# Patient Record
Sex: FEMALE
[De-identification: names, ages, dates, MRNs, and addresses within clinical notes are randomized per-mention and may not be internally consistent; named-entity substitution may affect disease eponyms.]

---

## 2019-08-12 ENCOUNTER — HOSPITAL ENCOUNTER (OUTPATIENT)
Dept: HOSPITAL 95 - ATC | Age: 74
Discharge: HOME | End: 2019-08-12
Attending: INTERNAL MEDICINE
Payer: MEDICARE

## 2019-08-12 DIAGNOSIS — C92.00: ICD-10-CM

## 2019-08-12 DIAGNOSIS — C93.10: ICD-10-CM

## 2019-08-12 DIAGNOSIS — C91.10: Primary | ICD-10-CM

## 2019-08-12 DIAGNOSIS — D69.3: ICD-10-CM

## 2019-08-12 DIAGNOSIS — Z87.891: ICD-10-CM

## 2019-08-12 LAB
BASOPHILS # BLD: 0 K/MM3 (ref 0–0.23)
BASOPHILS NFR BLD: 0 % (ref 0–2)
BLASTS NFR BLD MANUAL: 64 % (ref 0–0)
DEPRECATED RDW RBC AUTO: 73.9 FL (ref 35.1–46.3)
EOSINOPHIL # BLD: 0 K/MM3 (ref 0–0.68)
EOSINOPHIL NFR BLD: 0 % (ref 0–6)
ERYTHROCYTE [DISTWIDTH] IN BLOOD BY AUTOMATED COUNT: 21.9 % (ref 11.7–14.2)
HCT VFR BLD AUTO: 23.8 % (ref 33–51)
HGB BLD-MCNC: 7.6 G/DL (ref 11.5–16)
LYMPHOCYTES # BLD: 31.57 K/MM3 (ref 0.84–5.2)
LYMPHOCYTES NFR BLD: 36 % (ref 21–46)
MCHC RBC AUTO-ENTMCNC: 31.9 G/DL (ref 31.5–36.5)
MCV RBC AUTO: 97 FL (ref 80–100)
MONOCYTES # BLD: 0 K/MM3 (ref 0.16–1.47)
MONOCYTES NFR BLD: 0 % (ref 4–13)
NEUTS SEG # BLD MANUAL: 0 K/MM3 (ref 1.96–9.15)
NEUTS SEG NFR BLD MANUAL: 0 % (ref 41–73)
NRBC # BLD AUTO: 0.29 K/MM3 (ref 0–0.02)
NRBC BLD AUTO-RTO: 0.3 /100 WBC (ref 0–0.2)
PLATELET # BLD AUTO: 3 K/MM3 (ref 150–400)
TOTAL CELLS COUNTED BLD: 100

## 2019-08-12 PROCEDURE — P9035 PLATELET PHERES LEUKOREDUCED: HCPCS

## 2019-08-16 ENCOUNTER — HOSPITAL ENCOUNTER (OUTPATIENT)
Dept: HOSPITAL 95 - ATC | Age: 74
Discharge: HOME | End: 2019-08-16
Attending: INTERNAL MEDICINE
Payer: MEDICARE

## 2019-08-16 DIAGNOSIS — C93.10: ICD-10-CM

## 2019-08-16 DIAGNOSIS — C91.10: ICD-10-CM

## 2019-08-16 DIAGNOSIS — D69.3: ICD-10-CM

## 2019-08-16 DIAGNOSIS — C92.00: Primary | ICD-10-CM

## 2019-08-16 PROCEDURE — P9016 RBC LEUKOCYTES REDUCED: HCPCS

## 2019-08-16 PROCEDURE — C1751 CATH, INF, PER/CENT/MIDLINE: HCPCS

## 2019-08-19 LAB
DEPRECATED RDW RBC AUTO: 62.1 FL (ref 35.1–46.3)
ERYTHROCYTE [DISTWIDTH] IN BLOOD BY AUTOMATED COUNT: 18.9 % (ref 11.7–14.2)
HCT VFR BLD AUTO: 27.6 % (ref 33–51)
HGB BLD-MCNC: 9 G/DL (ref 11.5–16)
MCHC RBC AUTO-ENTMCNC: 32.6 G/DL (ref 31.5–36.5)
MCV RBC AUTO: 95 FL (ref 80–100)
NRBC # BLD AUTO: 0.37 K/MM3 (ref 0–0.02)
NRBC BLD AUTO-RTO: 0.3 /100 WBC (ref 0–0.2)
PLATELET # BLD AUTO: 11 K/MM3 (ref 150–400)

## 2019-08-20 ENCOUNTER — HOSPITAL ENCOUNTER (OUTPATIENT)
Dept: HOSPITAL 95 - ATC | Age: 74
Discharge: HOME | End: 2019-08-20
Attending: INTERNAL MEDICINE
Payer: MEDICARE

## 2019-08-20 DIAGNOSIS — D69.3: ICD-10-CM

## 2019-08-20 DIAGNOSIS — C93.10: Primary | ICD-10-CM

## 2019-08-20 DIAGNOSIS — Z79.899: ICD-10-CM

## 2019-08-20 DIAGNOSIS — C91.10: ICD-10-CM

## 2019-08-20 DIAGNOSIS — C92.00: ICD-10-CM

## 2019-08-20 DIAGNOSIS — Z87.891: ICD-10-CM

## 2019-08-20 PROCEDURE — P9053 PLT, PHER, L/R CMV-NEG, IRR: HCPCS

## 2019-08-22 ENCOUNTER — HOSPITAL ENCOUNTER (OUTPATIENT)
Dept: HOSPITAL 95 - ATC | Age: 74
Discharge: HOME | End: 2019-08-22
Attending: INTERNAL MEDICINE
Payer: MEDICARE

## 2019-08-22 DIAGNOSIS — C93.10: ICD-10-CM

## 2019-08-22 DIAGNOSIS — D69.3: ICD-10-CM

## 2019-08-22 DIAGNOSIS — Z87.891: ICD-10-CM

## 2019-08-22 DIAGNOSIS — C92.00: Primary | ICD-10-CM

## 2019-08-22 DIAGNOSIS — C91.10: ICD-10-CM

## 2019-08-22 LAB
ALBUMIN SERPL BCP-MCNC: 3.5 G/DL (ref 3.4–5)
ALBUMIN/GLOB SERPL: 0.9 {RATIO} (ref 0.8–1.8)
ALT SERPL W P-5'-P-CCNC: 54 U/L (ref 12–78)
ANION GAP SERPL CALCULATED.4IONS-SCNC: 9 MMOL/L (ref 6–16)
AST SERPL W P-5'-P-CCNC: 38 U/L (ref 12–37)
BASOPHILS # BLD: 0 K/MM3 (ref 0–0.23)
BASOPHILS NFR BLD: 0 % (ref 0–2)
BILIRUB SERPL-MCNC: 0.4 MG/DL (ref 0.1–1)
BLASTS NFR BLD MANUAL: 64 % (ref 0–0)
BUN SERPL-MCNC: 20 MG/DL (ref 8–24)
CALCIUM SERPL-MCNC: 9.1 MG/DL (ref 8.5–10.1)
CHLORIDE SERPL-SCNC: 106 MMOL/L (ref 98–108)
CO2 SERPL-SCNC: 26 MMOL/L (ref 21–32)
CREAT SERPL-MCNC: 0.52 MG/DL (ref 0.4–1)
DEPRECATED RDW RBC AUTO: 61.4 FL (ref 35.1–46.3)
EOSINOPHIL # BLD: 0 K/MM3 (ref 0–0.68)
EOSINOPHIL NFR BLD: 0 % (ref 0–6)
ERYTHROCYTE [DISTWIDTH] IN BLOOD BY AUTOMATED COUNT: 18.3 % (ref 11.7–14.2)
GLOBULIN SER CALC-MCNC: 3.7 G/DL (ref 2.2–4)
GLUCOSE SERPL-MCNC: 146 MG/DL (ref 70–99)
HCT VFR BLD AUTO: 25.8 % (ref 33–51)
HGB BLD-MCNC: 8.4 G/DL (ref 11.5–16)
LYMPHOCYTES # BLD: 28.75 K/MM3 (ref 0.84–5.2)
LYMPHOCYTES NFR BLD: 30 % (ref 21–46)
MCHC RBC AUTO-ENTMCNC: 32.6 G/DL (ref 31.5–36.5)
MCV RBC AUTO: 96 FL (ref 80–100)
MONOCYTES # BLD: 2.87 K/MM3 (ref 0.16–1.47)
MONOCYTES NFR BLD: 3 % (ref 4–13)
NEUTS SEG # BLD MANUAL: 0 K/MM3 (ref 1.96–9.15)
NEUTS SEG NFR BLD MANUAL: 0 % (ref 41–73)
NRBC # BLD AUTO: 0.32 K/MM3 (ref 0–0.02)
NRBC BLD AUTO-RTO: 0.3 /100 WBC (ref 0–0.2)
PHOSPHATE SERPL-MCNC: 2.8 MG/DL (ref 2.5–4.9)
PLATELET # BLD AUTO: 23 K/MM3 (ref 150–400)
POTASSIUM SERPL-SCNC: 3.4 MMOL/L (ref 3.5–5.5)
PROT SERPL-MCNC: 7.2 G/DL (ref 6.4–8.2)
SODIUM SERPL-SCNC: 141 MMOL/L (ref 136–145)
TOTAL CELLS COUNTED BLD: 100
URATE SERPL-MCNC: 5.4 MG/DL (ref 2.6–6)
WBC OTHER NFR BLD MANUAL: 3 % (ref 0–0)

## 2019-08-24 ENCOUNTER — HOSPITAL ENCOUNTER (OUTPATIENT)
Dept: HOSPITAL 95 - ATC | Age: 74
Discharge: HOME | End: 2019-08-24
Attending: INTERNAL MEDICINE
Payer: MEDICARE

## 2019-08-24 DIAGNOSIS — C91.10: ICD-10-CM

## 2019-08-24 DIAGNOSIS — C93.10: ICD-10-CM

## 2019-08-24 DIAGNOSIS — D69.3: ICD-10-CM

## 2019-08-24 DIAGNOSIS — Z87.891: ICD-10-CM

## 2019-08-24 DIAGNOSIS — C92.00: Primary | ICD-10-CM

## 2019-08-24 LAB
ALBUMIN SERPL BCP-MCNC: 3.3 G/DL (ref 3.4–5)
ALBUMIN/GLOB SERPL: 0.9 {RATIO} (ref 0.8–1.8)
ALT SERPL W P-5'-P-CCNC: 45 U/L (ref 12–78)
ANION GAP SERPL CALCULATED.4IONS-SCNC: 5 MMOL/L (ref 6–16)
AST SERPL W P-5'-P-CCNC: 37 U/L (ref 12–37)
BASOPHILS # BLD: 0 K/MM3 (ref 0–0.23)
BASOPHILS NFR BLD: 0 % (ref 0–2)
BILIRUB SERPL-MCNC: 0.4 MG/DL (ref 0.1–1)
BLASTS NFR BLD MANUAL: 77 % (ref 0–0)
BUN SERPL-MCNC: 22 MG/DL (ref 8–24)
CALCIUM SERPL-MCNC: 9.2 MG/DL (ref 8.5–10.1)
CHLORIDE SERPL-SCNC: 103 MMOL/L (ref 98–108)
CO2 SERPL-SCNC: 27 MMOL/L (ref 21–32)
CREAT SERPL-MCNC: 0.46 MG/DL (ref 0.4–1)
DEPRECATED RDW RBC AUTO: 58.1 FL (ref 35.1–46.3)
EOSINOPHIL # BLD: 0 K/MM3 (ref 0–0.68)
EOSINOPHIL NFR BLD: 0 % (ref 0–6)
ERYTHROCYTE [DISTWIDTH] IN BLOOD BY AUTOMATED COUNT: 17.8 % (ref 11.7–14.2)
GLOBULIN SER CALC-MCNC: 3.7 G/DL (ref 2.2–4)
GLUCOSE SERPL-MCNC: 118 MG/DL (ref 70–99)
HCT VFR BLD AUTO: 24.9 % (ref 33–51)
HGB BLD-MCNC: 8.1 G/DL (ref 11.5–16)
LYMPHOCYTES # BLD: 18.45 K/MM3 (ref 0.84–5.2)
LYMPHOCYTES NFR BLD: 21 % (ref 21–46)
MCHC RBC AUTO-ENTMCNC: 32.5 G/DL (ref 31.5–36.5)
MCV RBC AUTO: 94 FL (ref 80–100)
MONOCYTES # BLD: 0.87 K/MM3 (ref 0.16–1.47)
MONOCYTES NFR BLD: 1 % (ref 4–13)
NEUTS SEG # BLD MANUAL: 0.87 K/MM3 (ref 1.96–9.15)
NEUTS SEG NFR BLD MANUAL: 1 % (ref 41–73)
NRBC # BLD AUTO: 0.23 K/MM3 (ref 0–0.02)
NRBC BLD AUTO-RTO: 0.3 /100 WBC (ref 0–0.2)
PHOSPHATE SERPL-MCNC: 3.3 MG/DL (ref 2.5–4.9)
PLATELET # BLD AUTO: 12 K/MM3 (ref 150–400)
POTASSIUM SERPL-SCNC: 3.9 MMOL/L (ref 3.5–5.5)
PROT SERPL-MCNC: 7 G/DL (ref 6.4–8.2)
SODIUM SERPL-SCNC: 135 MMOL/L (ref 136–145)
TOTAL CELLS COUNTED BLD: 100
URATE SERPL-MCNC: 6.8 MG/DL (ref 2.6–6)

## 2019-08-25 NOTE — NUR
SPOKE WITH DR. MARY CASTELAN ANSWERING SERVICE CONCERNING THIS PTS LABS
THAT WERE DONE YESTERDAY.  DR CASTELAN WAS TRANSFERED TO LAB AND TOLD
CONCERNING THE CRITICAL VALUE.  THE PERSONS # WAS  LAB.PAS.

## 2019-08-26 ENCOUNTER — HOSPITAL ENCOUNTER (OUTPATIENT)
Dept: HOSPITAL 95 - ATC | Age: 74
Discharge: HOME | End: 2019-08-26
Attending: INTERNAL MEDICINE
Payer: MEDICARE

## 2019-08-26 DIAGNOSIS — C92.00: Primary | ICD-10-CM

## 2019-08-26 LAB
ALBUMIN SERPL BCP-MCNC: 3.4 G/DL
ALBUMIN/GLOB SERPL: 0.9 {RATIO}
ALT SERPL W P-5'-P-CCNC: 42 U/L
ANION GAP SERPL CALCULATED.4IONS-SCNC: 7 MMOL/L
AST SERPL W P-5'-P-CCNC: 33 U/L
BASOPHILS # BLD: 0 K/MM3
BASOPHILS NFR BLD: 0 %
BILIRUB SERPL-MCNC: 0.5 MG/DL
BLASTS NFR BLD MANUAL: 72 %
BUN SERPL-MCNC: 24 MG/DL
CALCIUM SERPL-MCNC: 9.3 MG/DL
CHLORIDE SERPL-SCNC: 103 MMOL/L
CO2 SERPL-SCNC: 26 MMOL/L
CREAT SERPL-MCNC: 0.51 MG/DL
DEPRECATED RDW RBC AUTO: 57.5 FL
EOSINOPHIL # BLD: 0 K/MM3
EOSINOPHIL NFR BLD: 0 %
ERYTHROCYTE [DISTWIDTH] IN BLOOD BY AUTOMATED COUNT: 18 %
GLOBULIN SER CALC-MCNC: 3.8 G/DL
GLUCOSE SERPL-MCNC: 93 MG/DL
HCT VFR BLD AUTO: 25.5 %
HGB BLD-MCNC: 8.4 G/DL
LYMPHOCYTES # BLD: 12.75 K/MM3
LYMPHOCYTES NFR BLD: 25 %
MCHC RBC AUTO-ENTMCNC: 32.9 G/DL
MCV RBC AUTO: 93 FL
MONOCYTES # BLD: 1.53 K/MM3
MONOCYTES NFR BLD: 3 %
NRBC # BLD AUTO: 0.21 K/MM3
NRBC BLD AUTO-RTO: 0.4 /100 WBC
PHOSPHATE SERPL-MCNC: 3.1 MG/DL
PLATELET # BLD AUTO: 6 K/MM3
POTASSIUM SERPL-SCNC: 3.7 MMOL/L
PROT SERPL-MCNC: 7.2 G/DL
SODIUM SERPL-SCNC: 136 MMOL/L
TOTAL CELLS COUNTED BLD: 100
URATE SERPL-MCNC: 6.8 MG/DL

## 2019-08-27 ENCOUNTER — HOSPITAL ENCOUNTER (OUTPATIENT)
Dept: HOSPITAL 95 - TRN | Age: 74
Discharge: HOME | End: 2019-08-27
Attending: INTERNAL MEDICINE
Payer: MEDICARE

## 2019-08-27 DIAGNOSIS — C93.10: ICD-10-CM

## 2019-08-27 DIAGNOSIS — Z79.899: ICD-10-CM

## 2019-08-27 DIAGNOSIS — D69.3: ICD-10-CM

## 2019-08-27 DIAGNOSIS — Z87.891: ICD-10-CM

## 2019-08-27 DIAGNOSIS — C91.10: ICD-10-CM

## 2019-08-27 DIAGNOSIS — C92.00: Primary | ICD-10-CM

## 2019-08-27 PROCEDURE — P9053 PLT, PHER, L/R CMV-NEG, IRR: HCPCS

## 2019-08-27 NOTE — NUR
SHE ARRIVED TO ROOM 344 AS AN OUTPT FOR A PLATELET INFUSION. ORDERS ENTERED.
BLOOD DRAWN EASILY FROM HER PICC LINE. SHE HAS BEEN TRYING TO SLEEP. SHE IS
A&O, WEAK, BUT ABLE TO TAKE A FEW STEPS OVER TO THE BED. I GAVE HER ICE WATER
TO DRINK AND SHE HAS A WARM BLANKET. SHE SAYS SHE ATE A LATE BREAKFAST AND IS
NOT HUNGRY AT THIS TIME.

## 2019-08-27 NOTE — NUR
NO PROBLEMS WITH PLATELET INFUSION. SHE HAS BEEN ESCORTED OUT TO HER CAR. NO
COMPLAINTS. CAP CHANGED ON HER PICC LINE POST TRANSFUSION.

## 2019-08-31 ENCOUNTER — HOSPITAL ENCOUNTER (INPATIENT)
Dept: HOSPITAL 95 - ER | Age: 74
LOS: 1 days | Discharge: HOSPICE | DRG: 871 | End: 2019-09-01
Attending: HOSPITALIST | Admitting: HOSPITALIST
Payer: MEDICARE

## 2019-08-31 VITALS — WEIGHT: 111.11 LBS | BODY MASS INDEX: 17.44 KG/M2 | HEIGHT: 67.01 IN

## 2019-08-31 DIAGNOSIS — A41.9: Primary | ICD-10-CM

## 2019-08-31 DIAGNOSIS — D70.9: ICD-10-CM

## 2019-08-31 DIAGNOSIS — I10: ICD-10-CM

## 2019-08-31 DIAGNOSIS — R65.21: ICD-10-CM

## 2019-08-31 DIAGNOSIS — Z51.5: ICD-10-CM

## 2019-08-31 DIAGNOSIS — D69.3: ICD-10-CM

## 2019-08-31 DIAGNOSIS — D63.0: ICD-10-CM

## 2019-08-31 DIAGNOSIS — C91.00: ICD-10-CM

## 2019-08-31 DIAGNOSIS — C93.10: ICD-10-CM

## 2019-08-31 DIAGNOSIS — Z90.81: ICD-10-CM

## 2019-08-31 DIAGNOSIS — E87.1: ICD-10-CM

## 2019-08-31 DIAGNOSIS — B37.0: ICD-10-CM

## 2019-08-31 LAB
ALBUMIN SERPL BCP-MCNC: 3.2 G/DL (ref 3.4–5)
ALBUMIN/GLOB SERPL: 0.8 {RATIO} (ref 0.8–1.8)
ALT SERPL W P-5'-P-CCNC: 80 U/L (ref 12–78)
ANION GAP SERPL CALCULATED.4IONS-SCNC: 7 MMOL/L (ref 6–16)
AST SERPL W P-5'-P-CCNC: 99 U/L (ref 12–37)
BASOPHILS # BLD: 0 K/MM3 (ref 0–0.23)
BASOPHILS NFR BLD: 0 % (ref 0–2)
BILIRUB SERPL-MCNC: 0.8 MG/DL (ref 0.1–1)
BLASTS NFR BLD MANUAL: 46 % (ref 0–0)
BUN SERPL-MCNC: 40 MG/DL (ref 8–24)
CALCIUM SERPL-MCNC: 9 MG/DL (ref 8.5–10.1)
CHLORIDE SERPL-SCNC: 102 MMOL/L (ref 98–108)
CO2 SERPL-SCNC: 25 MMOL/L (ref 21–32)
CREAT SERPL-MCNC: 0.61 MG/DL (ref 0.4–1)
DEPRECATED RDW RBC AUTO: 53.1 FL (ref 35.1–46.3)
EOSINOPHIL # BLD: 0 K/MM3 (ref 0–0.68)
EOSINOPHIL NFR BLD: 0 % (ref 0–6)
ERYTHROCYTE [DISTWIDTH] IN BLOOD BY AUTOMATED COUNT: 16.3 % (ref 11.7–14.2)
GLOBULIN SER CALC-MCNC: 3.8 G/DL (ref 2.2–4)
GLUCOSE SERPL-MCNC: 148 MG/DL (ref 70–99)
HCT VFR BLD AUTO: 25.2 % (ref 33–51)
HGB BLD-MCNC: 8.4 G/DL (ref 11.5–16)
LYMPHOCYTES # BLD: 0.68 K/MM3 (ref 0.84–5.2)
LYMPHOCYTES NFR BLD: 54 % (ref 21–46)
MCHC RBC AUTO-ENTMCNC: 33.3 G/DL (ref 31.5–36.5)
MCV RBC AUTO: 93 FL (ref 80–100)
MONOCYTES # BLD: 0 K/MM3 (ref 0.16–1.47)
MONOCYTES NFR BLD: 0 % (ref 4–13)
NEUTROPHILS NFR BLD AUTO: 0 % (ref 41–73)
NEUTS SEG # BLD MANUAL: 0 K/MM3 (ref 1.96–9.15)
NEUTS SEG NFR BLD MANUAL: 0 % (ref 41–73)
NRBC # BLD AUTO: 0 K/MM3 (ref 0–0.02)
NRBC BLD AUTO-RTO: 0 /100 WBC (ref 0–0.2)
PLATELET # BLD AUTO: 2 K/MM3 (ref 150–400)
POTASSIUM SERPL-SCNC: 4.4 MMOL/L (ref 3.5–5.5)
PROT SERPL-MCNC: 7 G/DL (ref 6.4–8.2)
PROT UR STRIP-MCNC: (no result) MG/DL
SODIUM SERPL-SCNC: 134 MMOL/L (ref 136–145)
SP GR SPEC: 1.02 (ref 1–1.02)
TOTAL CELLS COUNTED BLD: 100
UROBILINOGEN UR STRIP-MCNC: (no result) MG/DL

## 2019-08-31 PROCEDURE — A9270 NON-COVERED ITEM OR SERVICE: HCPCS

## 2019-08-31 PROCEDURE — P9016 RBC LEUKOCYTES REDUCED: HCPCS

## 2019-08-31 PROCEDURE — P9035 PLATELET PHERES LEUKOREDUCED: HCPCS

## 2019-09-01 NOTE — NUR
ASSUMED PT CARE FROM [facility]
PT ARRIVED ON UNIT VIA STRETCHER AND WAS ABLE TO STAND AND TRANSFER TO ICU
BED. PT ARRIVED SHIVERING STATING HOW COLD SHE WAS. TEMP 97.9; THEREFORE, PT
WAS WRAPPED WITH WARM BLANKETS TO HER COMFORT. PT ALERT AND ORIENTED X4; ABLE
TO MAKE NEEDS KNOWN. BLOOD PRESSURE LOW UPON ARRIVAL; PER ED REPORT PT ARRIVED
WITH  AND HAS SLOWLY DECLINED SINCE THEN WITH SBP 90'S UPON ARRIVAL.
TWO LITER BOLUSES COMPLETE UPON ARRIVAL TO UNIT. PT AGREED TO RECEIVING
VASOPRESSORS IF NEEDED. DAUGHTER AT BEDSIDE WHO IS PLEASANT AND COOPERATIVE
WITH CARE. NS STARTED AT 200MLS/HR; ALONG WITH MERREM AND ACYCLOVIR. PT
ORIENTED TO ROOM AND CALL LIGHT; PT IS ABLE TO MAKE NEEDS KNOWN.

## 2019-09-01 NOTE — NUR
PT'S SON IN LAW ARRIVED AND REQUESTED AN UPDATE REGARDING THE PT'S STATUS. SON
IN LAW VERY ADAMENT ABOUT PT WANTING TO GO HOME AND DIE THERE AS THERE "ISN'T
ANYTHING ELSE THAT CAN BE DONE TO PROLONG PT'S LIFE". SON IN LAW WAS EDUCATED
REGARDING PT'S WISHES TO PROLONG LIFE AT LEAST ANOTHER DAY UNTIL HER SON COULD
BE HERE FROM [place] AS HE HAD ALREADY PURCHASED HIS PLANE TICKET. PT KEPT
ASKING HER DAUGHTER WHAT SHE THOUGHT AND WANTED. I REASSURED PT THAT SHE
SHOULD MAKE HER DECISIONS SOLEY BASED UPON WHAT SHE WANTED TO BE COMFORTABLE
IN HER LAST STAGES OF LIFE. ALSO EDUCATED FAMILY AND PT REGARDING GOING HOME
PRIOR TO HOSPICE SERVICES BEING CONSULTED D/T NO AVAILABILITY IN MEDICATIONS.
SON IN LAW STATED THAT HE "OWNED AND AMBULANCE" AND COULD GET HER BACK HERE IF
NEEDED. PT WAS READY TO DISCUSS GOING HOME TO BE COMFORTABLE WITH DR. BEASLEY.

## 2019-09-01 NOTE — NUR
DR. BEASLEY IN ROOM TO DISCUSS PT'S WISHES REGARDING GOING HOME RATHER THAN
STAYING IN THE HOSPITAL ON COMFORT CARE. PT ADAMENT ABOUT GOING HOME TO DIE
NATURALLY AND NOT RECEIVING ANYMORE TREATMENTS.
DR. BEASLEY EXPLAINED TO FAMILY AND PT THAT THEY WOULD NEED TO CONTACT DR. CASTELAN OFFICE TOMORROW IN ORDER TO GET A HOSPICE REFERRAL; PT AND FAMILY
VERBALIZED UNDERSTANDING.
FLUIDS STOPPED AT THIS TIME. WAITING FOR DISCHARGE ORDERS FROM DR. BEASLEY

## 2019-09-01 NOTE — NUR
DISCHARGE
PERIPHERAL IV TO LEFT WRIST WAS DISCONTINUED. DISCHARGE INSTRUCTIONS WERE GONE
OVER WITH BOTH PATIENT AND FAMILY. AUGMENTIN WAS CALLED INTO [facility]
PER FAMILIES REQUEST. PT WAS TRANSFERRED TO WHEELCHAIR WITH ALL BELONGINGS AND
ASSISTED OUT TO CAR. FAMILY WAS GIVEN PACKET OF DISCHARGE INFORMATION WITH
ICU'S UNIT NUMBER FOR ANY QUESTIONS OR CONCERNS. PT VERY GRATEFUL OF CARE
WHILE SHE WAS HERE, BUT STATED "IT WASN'T BECAUSE OF US" THAT SHE HAD TO
LEAVE. PT REASSURED THAT WHAT SHE WAS DOING WAS WHAT WAS BEST FOR HER. FAMILY
TEARFUL, BUT APPRECIATIVE AND GRATEFUL OF SERVICES.

## 2019-09-02 LAB
ALBUMIN SERPL BCP-MCNC: 2.8 G/DL (ref 3.4–5)
ALBUMIN/GLOB SERPL: 0.8 {RATIO} (ref 0.8–1.8)
ALT SERPL W P-5'-P-CCNC: 62 U/L (ref 12–78)
ANION GAP SERPL CALCULATED.4IONS-SCNC: 4 MMOL/L (ref 6–16)
AST SERPL W P-5'-P-CCNC: 46 U/L (ref 12–37)
BASOPHILS # BLD: 0 K/MM3 (ref 0–0.23)
BASOPHILS NFR BLD: 0 % (ref 0–2)
BILIRUB SERPL-MCNC: 0.5 MG/DL (ref 0.1–1)
BLASTS NFR BLD MANUAL: 14 % (ref 0–0)
BUN SERPL-MCNC: 23 MG/DL (ref 8–24)
CALCIUM SERPL-MCNC: 8.7 MG/DL (ref 8.5–10.1)
CHLORIDE SERPL-SCNC: 109 MMOL/L (ref 98–108)
CO2 SERPL-SCNC: 25 MMOL/L (ref 21–32)
CREAT SERPL-MCNC: 0.47 MG/DL (ref 0.4–1)
DEPRECATED RDW RBC AUTO: 54.8 FL (ref 35.1–46.3)
EOSINOPHIL # BLD: 0 K/MM3 (ref 0–0.68)
EOSINOPHIL NFR BLD: 0 % (ref 0–6)
ERYTHROCYTE [DISTWIDTH] IN BLOOD BY AUTOMATED COUNT: 16.1 % (ref 11.7–14.2)
GLOBULIN SER CALC-MCNC: 3.6 G/DL (ref 2.2–4)
GLUCOSE SERPL-MCNC: 128 MG/DL (ref 70–99)
HCT VFR BLD AUTO: 19.5 % (ref 33–51)
HGB BLD-MCNC: 6.4 G/DL (ref 11.5–16)
LYMPHOCYTES # BLD: 0.65 K/MM3 (ref 0.84–5.2)
LYMPHOCYTES NFR BLD: 86 % (ref 21–46)
MCHC RBC AUTO-ENTMCNC: 32.8 G/DL (ref 31.5–36.5)
MCV RBC AUTO: 96 FL (ref 80–100)
MONOCYTES # BLD: 0 K/MM3 (ref 0.16–1.47)
MONOCYTES NFR BLD: 0 % (ref 4–13)
NRBC # BLD AUTO: 0 K/MM3 (ref 0–0.02)
NRBC BLD AUTO-RTO: 0 /100 WBC (ref 0–0.2)
PHOSPHATE SERPL-MCNC: 2.1 MG/DL (ref 2.5–4.9)
PLATELET # BLD AUTO: 6 K/MM3 (ref 150–400)
POTASSIUM SERPL-SCNC: 3.2 MMOL/L (ref 3.5–5.5)
PROT SERPL-MCNC: 6.4 G/DL (ref 6.4–8.2)
SODIUM SERPL-SCNC: 138 MMOL/L (ref 136–145)
TOTAL CELLS COUNTED BLD: 50
URATE SERPL-MCNC: 4.9 MG/DL (ref 2.6–6)

## 2019-09-02 NOTE — NUR
PT WAS DISCHARGED FROM ICU LAST NIGHT WITH A HOSPIC/COMFORT CARE ODERS.  PT
CAME IN FOR LABS BUT WAS NOT SURE IF THE DR WANTED HER TO COME IN.  SON IN LAW
WITH PT.  PT WITH LARGE SWOLLEN IDSCOLORED AREA ON R LOWER LIP.

## 2019-09-03 ENCOUNTER — HOSPITAL ENCOUNTER (OUTPATIENT)
Dept: HOSPITAL 95 - ATC | Age: 74
Discharge: HOME | End: 2019-09-03
Attending: INTERNAL MEDICINE
Payer: MEDICARE

## 2019-09-03 DIAGNOSIS — Z79.899: ICD-10-CM

## 2019-09-03 DIAGNOSIS — Z87.891: ICD-10-CM

## 2019-09-03 DIAGNOSIS — C92.00: ICD-10-CM

## 2019-09-03 DIAGNOSIS — D69.3: ICD-10-CM

## 2019-09-03 DIAGNOSIS — C93.10: Primary | ICD-10-CM

## 2019-09-03 DIAGNOSIS — C91.10: ICD-10-CM

## 2019-09-03 PROCEDURE — P9053 PLT, PHER, L/R CMV-NEG, IRR: HCPCS

## 2019-09-05 ENCOUNTER — HOSPITAL ENCOUNTER (OUTPATIENT)
Dept: HOSPITAL 95 - ATC | Age: 74
Discharge: HOME | End: 2019-09-05
Attending: INTERNAL MEDICINE
Payer: MEDICARE

## 2019-09-05 DIAGNOSIS — Z79.899: ICD-10-CM

## 2019-09-05 DIAGNOSIS — C91.10: ICD-10-CM

## 2019-09-05 DIAGNOSIS — C93.10: Primary | ICD-10-CM

## 2019-09-05 DIAGNOSIS — D69.3: ICD-10-CM

## 2019-09-05 DIAGNOSIS — C92.00: ICD-10-CM

## 2019-09-05 LAB
ALBUMIN SERPL BCP-MCNC: 2.8 G/DL
ALBUMIN/GLOB SERPL: 0.8 {RATIO}
ALT SERPL W P-5'-P-CCNC: 47 U/L
ANION GAP SERPL CALCULATED.4IONS-SCNC: 5 MMOL/L
AST SERPL W P-5'-P-CCNC: 46 U/L
BASOPHILS # BLD: 0 K/MM3
BASOPHILS NFR BLD: 0 %
BILIRUB SERPL-MCNC: 0.3 MG/DL
BLASTS NFR BLD MANUAL: 10 %
BUN SERPL-MCNC: 14 MG/DL
CALCIUM SERPL-MCNC: 9 MG/DL
CHLORIDE SERPL-SCNC: 106 MMOL/L
CO2 SERPL-SCNC: 26 MMOL/L
CREAT SERPL-MCNC: 0.45 MG/DL
DEPRECATED RDW RBC AUTO: 54.9 FL
EOSINOPHIL # BLD: 0.03 K/MM3
EOSINOPHIL NFR BLD: 2 %
ERYTHROCYTE [DISTWIDTH] IN BLOOD BY AUTOMATED COUNT: 15.9 %
GLOBULIN SER CALC-MCNC: 3.4 G/DL
GLUCOSE SERPL-MCNC: 116 MG/DL
HCT VFR BLD AUTO: 19.2 %
HGB BLD-MCNC: 6.3 G/DL
LYMPHOCYTES # BLD: 1.32 K/MM3
LYMPHOCYTES NFR BLD: 88 %
MCHC RBC AUTO-ENTMCNC: 32.8 G/DL
MCV RBC AUTO: 96 FL
MONOCYTES # BLD: 0 K/MM3
MONOCYTES NFR BLD: 0 %
NRBC # BLD AUTO: 0 K/MM3
NRBC BLD AUTO-RTO: 0 /100 WBC
PHOSPHATE SERPL-MCNC: 2.5 MG/DL
PLATELET # BLD AUTO: 14 K/MM3
POTASSIUM SERPL-SCNC: 4.1 MMOL/L
PROT SERPL-MCNC: 6.2 G/DL
SODIUM SERPL-SCNC: 137 MMOL/L
TOTAL CELLS COUNTED BLD: 50
URATE SERPL-MCNC: 2.9 MG/DL

## 2019-09-05 PROCEDURE — P9016 RBC LEUKOCYTES REDUCED: HCPCS

## 2019-09-06 NOTE — NUR
Late entry for 9/5/19 at 1430:
 
Palliative Care Note:
 
Pt came into the [facility] for lab draw from PICC line and she and her son and
daughter had questions re: nursing care at home.  Pt's daughter states that
they are not meaning hospice, but want to know what other options are
available to her.  Pt lives alone, however she has been staying with her
daughter and son in law recently.  They both work all day and are worried that
she is home alone.  Pt reports she feels safe staying alone but family has
concerns.  She has a son from [place] who is also visiting at this time.
 
She reports that she has had two chemotherapy infusions and she is due to have
one more but they are waiting a little extra time in between the second and
third treatments.  She reports that she has a follow up appointment with Dr. Nicole next Tuesday and has an initial appointment with a new PCP to
establish care later that same week.  Elise states "I'm afraid my body is
giving out.  My bone marrow is full of cancer."  She states she is not ready
to "give up yet," however she reports she has had conversations with her
children and states that she does not want to suffer if there is nothing more
that can be done.
 
Currently she has mouth sores and c/o a sore throat.  She has magic mouthwash
and an OTC lidocaine that helps with the pain.  She states Dr. Nicole is
aware of her mouth sores and she plans to have him look at her sores when she
sees him next Tuesday.  She is concerned that the sores aren't healing.
Discussed chemotherapy and difficulty of healing while receiving chemo
treatments.
 
Discussed options of HH vs. hospice, vs. option of private pay caregivers.
She is not a , however she does have medicare coverage in place.
Discussed differences between HH and hospice.  Elise plans to follow up with
Dr. Nicole next week after having labs done and will get his opinion on how
her treatment is going.  She will return to the [facility] next week for lab draws
and this writer encouraged her to ask staff if she had any additional
questions to please ask them.  PC will continue to follow and assist with
advanced care planning and symptom management.